# Patient Record
Sex: OTHER/UNKNOWN | Race: WHITE | NOT HISPANIC OR LATINO | Employment: OTHER | URBAN - METROPOLITAN AREA
[De-identification: names, ages, dates, MRNs, and addresses within clinical notes are randomized per-mention and may not be internally consistent; named-entity substitution may affect disease eponyms.]

---

## 2020-01-09 ENCOUNTER — NEW PATIENT COMPREHENSIVE (OUTPATIENT)
Dept: URBAN - METROPOLITAN AREA CLINIC 32 | Facility: CLINIC | Age: 25
End: 2020-01-09

## 2020-01-09 DIAGNOSIS — H18.59: ICD-10-CM

## 2020-01-09 DIAGNOSIS — H04.123: ICD-10-CM

## 2020-01-09 PROCEDURE — 92015 DETERMINE REFRACTIVE STATE: CPT

## 2020-01-09 PROCEDURE — 92004 COMPRE OPH EXAM NEW PT 1/>: CPT

## 2020-01-09 ASSESSMENT — KERATOMETRY
OD_K1POWER_DIOPTERS: 43.25
OD_AXISANGLE_DEGREES: 35
OD_K2POWER_DIOPTERS: 43
OD_AXISANGLE2_DEGREES: 125
OS_AXISANGLE_DEGREES: 128
OS_K2POWER_DIOPTERS: 42.75
OS_AXISANGLE2_DEGREES: 38
OS_K1POWER_DIOPTERS: 43

## 2020-01-09 ASSESSMENT — VISUAL ACUITY
OS_SC: 20/20
OD_SC: J1+
OD_SC: 20/20-2
OS_SC: J1+
OU_SC: 20/20

## 2020-10-20 ENCOUNTER — WEB ENCOUNTER (OUTPATIENT)
Dept: URBAN - METROPOLITAN AREA CLINIC 96 | Facility: CLINIC | Age: 25
End: 2020-10-20

## 2020-10-20 ENCOUNTER — OFFICE VISIT (OUTPATIENT)
Dept: URBAN - METROPOLITAN AREA CLINIC 96 | Facility: CLINIC | Age: 25
End: 2020-10-20
Payer: COMMERCIAL

## 2020-10-20 ENCOUNTER — DASHBOARD ENCOUNTERS (OUTPATIENT)
Age: 25
End: 2020-10-20

## 2020-10-20 DIAGNOSIS — R12 HEARTBURN: ICD-10-CM

## 2020-10-20 DIAGNOSIS — R14.0 BLOATING: ICD-10-CM

## 2020-10-20 DIAGNOSIS — R13.10 DYSPHAGIA, UNSPECIFIED TYPE: ICD-10-CM

## 2020-10-20 DIAGNOSIS — R10.13 EPIGASTRIC PAIN: ICD-10-CM

## 2020-10-20 PROBLEM — 40739000: Status: ACTIVE | Noted: 2020-10-20

## 2020-10-20 PROCEDURE — G8417 CALC BMI ABV UP PARAM F/U: HCPCS | Performed by: INTERNAL MEDICINE

## 2020-10-20 PROCEDURE — G8427 DOCREV CUR MEDS BY ELIG CLIN: HCPCS | Performed by: INTERNAL MEDICINE

## 2020-10-20 PROCEDURE — 99204 OFFICE O/P NEW MOD 45 MIN: CPT | Performed by: INTERNAL MEDICINE

## 2020-10-20 PROCEDURE — G9903 PT SCRN TBCO ID AS NON USER: HCPCS | Performed by: INTERNAL MEDICINE

## 2020-10-20 PROCEDURE — G8483 FLU IMM NO ADMIN DOC REA: HCPCS | Performed by: INTERNAL MEDICINE

## 2020-10-20 RX ORDER — OMEPRAZOLE 40 MG/1
1 CAPSULE 30 MINUTES BEFORE MORNING MEAL CAPSULE, DELAYED RELEASE ORAL ONCE A DAY
Qty: 30 | OUTPATIENT
Start: 2020-10-20

## 2020-10-20 NOTE — PHYSICAL EXAM CONSTITUTIONAL:
well developed, well nourished , in no acute distress , ambulating without difficulty , normal communication ability, anxious, tattooed

## 2020-10-20 NOTE — HPI-OTHER HISTORIES
Patient self referred for one month of nausea which awoke him at night. Initially improved, but has recurred. Will have burning in the epigastric area and worried he has an ulcer. Takes Tums prn which helps minimally. Reports ibuprofen several times a week for the past several years. No prior EGD. No hx of PUD. Nausea with no emesis.   No diet changes. GFD for the past several months. No melena, no rectal bleeding. No changes in BM. RUQ ultrasound in [place] few weeks ago normal per patient with no gallstones. No prior H pylori infection , remote testing negative. Told he had fatty liver. LFTS normal per patient with primary MD recently. No food allergies.   Does report depression/anxiety which is being treated with meds currently. Followed by psychiatrist. No chronic MJ, no hx of hepatitis. No IVDA. Denies any alcohol. No herbal or alt meds. Taking probiotic which has helped. Dysphagia with no odynophagia.

## 2020-10-30 ENCOUNTER — TELEPHONE ENCOUNTER (OUTPATIENT)
Dept: URBAN - METROPOLITAN AREA CLINIC 100 | Facility: CLINIC | Age: 25
End: 2020-10-30

## 2020-11-04 ENCOUNTER — ERX REFILL RESPONSE (OUTPATIENT)
Dept: URBAN - METROPOLITAN AREA CLINIC 96 | Facility: CLINIC | Age: 25
End: 2020-11-04

## 2020-11-04 RX ORDER — OMEPRAZOLE 40 MG/1
1 CAPSULE 30 MINUTES BEFORE MORNING MEAL CAPSULE, DELAYED RELEASE ORAL ONCE A DAY
Qty: 30 | Refills: 0 | OUTPATIENT

## 2020-11-04 RX ORDER — OMEPRAZOLE 40 MG/1
TAKE 1 CAPSULE BY MOUTH EVERY MORNING BEFORE MORNING MEAL CAPSULE, DELAYED RELEASE ORAL
Qty: 90 CAPSULE | Refills: 0 | OUTPATIENT

## 2021-05-06 ENCOUNTER — OFFICE VISIT (OUTPATIENT)
Dept: URBAN - METROPOLITAN AREA CLINIC 68 | Facility: CLINIC | Age: 26
End: 2021-05-06

## 2021-12-30 ENCOUNTER — OFFICE VISIT (OUTPATIENT)
Dept: URBAN - METROPOLITAN AREA CLINIC 66 | Facility: CLINIC | Age: 26
End: 2021-12-30

## 2022-01-01 ENCOUNTER — OFFICE VISIT (OUTPATIENT)
Dept: URBAN - METROPOLITAN AREA CLINIC 68 | Facility: CLINIC | Age: 27
End: 2022-01-01

## 2022-01-06 ENCOUNTER — OFFICE VISIT (OUTPATIENT)
Dept: URBAN - METROPOLITAN AREA CLINIC 66 | Facility: CLINIC | Age: 27
End: 2022-01-06

## 2022-01-20 ENCOUNTER — OFFICE VISIT (OUTPATIENT)
Dept: URBAN - METROPOLITAN AREA CLINIC 66 | Facility: CLINIC | Age: 27
End: 2022-01-20

## 2022-01-25 LAB
A/G RATIO: 2.1
A/G RATIO: 2.1
ALBUMIN: (no result)
ALBUMIN: (no result)
ALKALINE PHOSPHATASE: (no result)
ALKALINE PHOSPHATASE: (no result)
ALT (SGPT): (no result)
ALT (SGPT): (no result)
AMBIG ABBREV CMP14 DEFAULT: (no result)
AMBIG ABBREV CMP14 DEFAULT: (no result)
AMBIG ABBREV LP DEFAULT: (no result)
AMBIG ABBREV LP DEFAULT: (no result)
AST (SGOT): (no result)
AST (SGOT): (no result)
BASO (ABSOLUTE): (no result)
BASO (ABSOLUTE): (no result)
BASOS: (no result)
BASOS: (no result)
BILIRUBIN, TOTAL: (no result)
BILIRUBIN, TOTAL: (no result)
BUN/CREATININE RATIO: 10
BUN/CREATININE RATIO: 10
BUN: (no result)
BUN: (no result)
CALCIUM: (no result)
CALCIUM: (no result)
CARBON DIOXIDE, TOTAL: (no result)
CARBON DIOXIDE, TOTAL: (no result)
CHLORIDE: (no result)
CHLORIDE: (no result)
CHOLESTEROL, TOTAL: (no result)
CHOLESTEROL, TOTAL: (no result)
COMMENT:: (no result)
COMMENT:: (no result)
CREATININE: (no result)
CREATININE: (no result)
EGFR IF AFRICN AM: (no result)
EGFR IF AFRICN AM: (no result)
EGFR IF NONAFRICN AM: (no result)
EGFR IF NONAFRICN AM: (no result)
EOS (ABSOLUTE): (no result)
EOS (ABSOLUTE): (no result)
EOS: (no result)
EOS: (no result)
GLOBULIN, TOTAL: (no result)
GLOBULIN, TOTAL: (no result)
GLUCOSE: (no result)
GLUCOSE: (no result)
HDL CHOLESTEROL: (no result)
HDL CHOLESTEROL: (no result)
HEMATOCRIT: (no result)
HEMATOCRIT: (no result)
HEMATOLOGY COMMENTS:: (no result)
HEMATOLOGY COMMENTS:: (no result)
HEMOGLOBIN A1C: (no result)
HEMOGLOBIN A1C: (no result)
HEMOGLOBIN: (no result)
HEMOGLOBIN: (no result)
IMMATURE CELLS: (no result)
IMMATURE CELLS: (no result)
IMMATURE GRANS (ABS): (no result)
IMMATURE GRANS (ABS): (no result)
IMMATURE GRANULOCYTES: (no result)
IMMATURE GRANULOCYTES: (no result)
LDL CHOL CALC (NIH): (no result)
LDL CHOL CALC (NIH): (no result)
LYMPHS (ABSOLUTE): (no result)
LYMPHS (ABSOLUTE): (no result)
LYMPHS: (no result)
LYMPHS: (no result)
MCH: (no result)
MCH: (no result)
MCHC: (no result)
MCHC: (no result)
MCV: (no result)
MCV: (no result)
MONOCYTES(ABSOLUTE): (no result)
MONOCYTES(ABSOLUTE): (no result)
MONOCYTES: (no result)
MONOCYTES: (no result)
NEUTROPHILS (ABSOLUTE): (no result)
NEUTROPHILS (ABSOLUTE): (no result)
NEUTROPHILS: (no result)
NEUTROPHILS: (no result)
NRBC: (no result)
NRBC: (no result)
PLATELETS: (no result)
PLATELETS: (no result)
POTASSIUM: (no result)
POTASSIUM: (no result)
PROTEIN, TOTAL: (no result)
PROTEIN, TOTAL: (no result)
RBC: (no result)
RBC: (no result)
RDW: (no result)
RDW: (no result)
SODIUM: (no result)
SODIUM: (no result)
TRIGLYCERIDES: (no result)
TRIGLYCERIDES: (no result)
TSH: (no result)
TSH: (no result)
VLDL CHOLESTEROL CAL: (no result)
VLDL CHOLESTEROL CAL: (no result)
WBC: (no result)
WBC: (no result)

## 2022-01-26 ENCOUNTER — LAB OUTSIDE AN ENCOUNTER (OUTPATIENT)
Dept: URBAN - METROPOLITAN AREA CLINIC 68 | Facility: CLINIC | Age: 27
End: 2022-01-26

## 2022-01-26 ENCOUNTER — TELEPHONE ENCOUNTER (OUTPATIENT)
Dept: URBAN - METROPOLITAN AREA CLINIC 68 | Facility: CLINIC | Age: 27
End: 2022-01-26

## 2022-01-26 ENCOUNTER — OFFICE VISIT (OUTPATIENT)
Dept: URBAN - METROPOLITAN AREA CLINIC 68 | Facility: CLINIC | Age: 27
End: 2022-01-26

## 2022-01-31 ENCOUNTER — TELEPHONE ENCOUNTER (OUTPATIENT)
Dept: URBAN - METROPOLITAN AREA CLINIC 68 | Facility: CLINIC | Age: 27
End: 2022-01-31

## 2022-02-04 ENCOUNTER — OFFICE VISIT (OUTPATIENT)
Dept: URBAN - METROPOLITAN AREA CLINIC 68 | Facility: CLINIC | Age: 27
End: 2022-02-04

## 2022-02-07 ENCOUNTER — TELEPHONE ENCOUNTER (OUTPATIENT)
Dept: URBAN - METROPOLITAN AREA CLINIC 68 | Facility: CLINIC | Age: 27
End: 2022-02-07

## 2022-02-08 ENCOUNTER — TELEPHONE ENCOUNTER (OUTPATIENT)
Dept: URBAN - METROPOLITAN AREA CLINIC 68 | Facility: CLINIC | Age: 27
End: 2022-02-08

## 2022-03-03 ENCOUNTER — OFFICE VISIT (OUTPATIENT)
Dept: URBAN - METROPOLITAN AREA CLINIC 66 | Facility: CLINIC | Age: 27
End: 2022-03-03

## 2022-03-03 ENCOUNTER — TELEPHONE ENCOUNTER (OUTPATIENT)
Dept: URBAN - METROPOLITAN AREA CLINIC 68 | Facility: CLINIC | Age: 27
End: 2022-03-03

## 2022-06-04 ENCOUNTER — TELEPHONE ENCOUNTER (OUTPATIENT)
Dept: URBAN - METROPOLITAN AREA CLINIC 68 | Facility: CLINIC | Age: 27
End: 2022-06-04

## 2022-06-05 ENCOUNTER — TELEPHONE ENCOUNTER (OUTPATIENT)
Dept: URBAN - METROPOLITAN AREA CLINIC 68 | Facility: CLINIC | Age: 27
End: 2022-06-05

## 2022-06-05 RX ORDER — OMEPRAZOLE 20 MG/1
OMEPRAZOLE( 20MG ORAL 1 DAILY ) ACTIVE -HX ENTRY CAPSULE, DELAYED RELEASE ORAL DAILY
Status: ACTIVE | COMMUNITY
Start: 2022-03-03

## 2022-06-05 RX ORDER — ATENOLOL 25 MG/1
ATENOLOL( 25MG ORAL 1 BID ) ACTIVE -HX ENTRY TABLET ORAL BID
Status: ACTIVE | COMMUNITY
Start: 2022-03-03

## 2022-06-25 ENCOUNTER — TELEPHONE ENCOUNTER (OUTPATIENT)
Age: 27
End: 2022-06-25

## 2022-06-26 ENCOUNTER — TELEPHONE ENCOUNTER (OUTPATIENT)
Age: 27
End: 2022-06-26

## 2022-06-26 RX ORDER — ATENOLOL 25 MG/1
ATENOLOL( 25MG ORAL 1 BID ) ACTIVE -HX ENTRY TABLET ORAL BID
Status: ACTIVE | COMMUNITY
Start: 2022-03-03

## 2022-06-26 RX ORDER — OMEPRAZOLE 20 MG/1
OMEPRAZOLE( 20MG ORAL 1 DAILY ) ACTIVE -HX ENTRY CAPSULE, DELAYED RELEASE ORAL DAILY
Status: ACTIVE | COMMUNITY
Start: 2022-03-03

## 2023-03-03 ENCOUNTER — OFFICE VISIT (OUTPATIENT)
Dept: URBAN - METROPOLITAN AREA CLINIC 68 | Facility: CLINIC | Age: 28
End: 2023-03-03